# Patient Record
Sex: FEMALE | Race: WHITE | Employment: OTHER | URBAN - METROPOLITAN AREA
[De-identification: names, ages, dates, MRNs, and addresses within clinical notes are randomized per-mention and may not be internally consistent; named-entity substitution may affect disease eponyms.]

---

## 2018-02-13 ENCOUNTER — HOSPITAL ENCOUNTER (OUTPATIENT)
Dept: WOMENS IMAGING | Age: 83
Discharge: HOME | End: 2018-02-15
Payer: MEDICARE

## 2018-02-13 DIAGNOSIS — Z12.31 ENCOUNTER FOR SCREENING MAMMOGRAM FOR BREAST CANCER: ICD-10-CM

## 2018-02-13 PROCEDURE — 77063 BREAST TOMOSYNTHESIS BI: CPT

## 2019-05-08 ENCOUNTER — HOSPITAL ENCOUNTER (OUTPATIENT)
Dept: WOMENS IMAGING | Age: 84
Discharge: HOME | End: 2019-05-10
Payer: MEDICARE

## 2019-05-08 DIAGNOSIS — Z12.31 ENCOUNTER FOR SCREENING MAMMOGRAM FOR BREAST CANCER: ICD-10-CM

## 2019-05-08 PROCEDURE — 77063 BREAST TOMOSYNTHESIS BI: CPT

## 2020-11-05 ENCOUNTER — HOSPITAL ENCOUNTER (OUTPATIENT)
Dept: WOMENS IMAGING | Age: 85
Discharge: HOME | End: 2020-11-07
Payer: MEDICARE

## 2020-11-05 PROCEDURE — 77063 BREAST TOMOSYNTHESIS BI: CPT

## 2021-12-17 ENCOUNTER — HOSPITAL ENCOUNTER (OUTPATIENT)
Dept: WOMENS IMAGING | Age: 86
Discharge: HOME | End: 2021-12-19
Payer: MEDICARE

## 2021-12-17 DIAGNOSIS — Z12.31 ENCOUNTER FOR SCREENING MAMMOGRAM FOR MALIGNANT NEOPLASM OF BREAST: ICD-10-CM

## 2021-12-17 PROCEDURE — 77063 BREAST TOMOSYNTHESIS BI: CPT

## 2022-12-28 ENCOUNTER — HOSPITAL ENCOUNTER (OUTPATIENT)
Dept: WOMENS IMAGING | Age: 87
Discharge: HOME | End: 2022-12-30
Payer: MEDICARE

## 2022-12-28 DIAGNOSIS — Z12.31 ENCOUNTER FOR SCREENING MAMMOGRAM FOR MALIGNANT NEOPLASM OF BREAST: ICD-10-CM

## 2022-12-28 PROCEDURE — 77067 SCR MAMMO BI INCL CAD: CPT

## 2023-12-09 PROCEDURE — 87086 URINE CULTURE/COLONY COUNT: CPT

## 2023-12-09 PROCEDURE — 87186 SC STD MICRODIL/AGAR DIL: CPT

## 2023-12-10 ENCOUNTER — LAB REQUISITION (OUTPATIENT)
Dept: LAB | Facility: HOSPITAL | Age: 88
End: 2023-12-10
Payer: MEDICARE

## 2023-12-10 DIAGNOSIS — N39.0 URINARY TRACT INFECTION, SITE NOT SPECIFIED: ICD-10-CM

## 2023-12-10 DIAGNOSIS — R53.83 OTHER FATIGUE: ICD-10-CM

## 2023-12-12 LAB — BACTERIA UR CULT: ABNORMAL

## 2025-03-05 PROBLEM — N64.4 BREAST PAIN: Status: ACTIVE | Noted: 2025-03-05

## 2025-03-05 NOTE — PROGRESS NOTES
[facility]  Petra Valera female   6/3/1934 89 y.o.   [id number]      Chief Complaint  New patient, right axillary pain.    History Of Present Illness  Petra Valera is a very pleasant 89 y.o.  female with history of right breast cancer 25 years ago s/p partial mastectomy/ALND, radiation, and chemotherapy. She first noticed the pain after living heavy tree branches. She describes the pain under her right axilla and states it is not resolved with tylenol and OTC topicals. She has a history of back pain. SShe has no family history of breast cancer.     REPRODUCTIVE HISTORY: menarche age 12, , first birth age 26, did not breastfeed, no OCP's, natural menopause age 42, no HRT, scattered fibroglandular tissue    FAMILY CANCER HISTORY:   Mother: Colon cancer, age 57     Review of Systems  Constitutional:  Negative for appetite change, fatigue, fever and unexpected weight change.   HENT:  Negative for ear pain, hearing loss, nosebleeds, sore throat and trouble swallowing.    Eyes:  Negative for discharge, itching and visual disturbance.   Breast: As stated in HPI.  Respiratory:  Negative for cough, chest tightness and shortness of breath.    Cardiovascular:  Negative for chest pain, palpitations and leg swelling.   Gastrointestinal:  Negative for abdominal pain, constipation, diarrhea and nausea.   Endocrine: Negative for cold intolerance and heat intolerance.   Genitourinary:  Negative for dysuria, frequency, hematuria, pelvic pain and vaginal bleeding.   Musculoskeletal:  Negative for arthralgias, back pain, gait problem, joint swelling and myalgias.   Skin:  Negative for color change and rash.   Allergic/Immunologic: Negative for environmental allergies and food allergies.   Neurological:  Negative for dizziness, tremors, speech difficulty, weakness, numbness and headaches.   Hematological:  Does not bruise/bleed easily.   Psychiatric/Behavioral:  Negative for agitation, dysphoric  mood and sleep disturbance. The patient is not nervous/anxious.       Past Medical History  She has a past medical history of Breast cancer (Multi), antineoplastic chemo, and Personal history of irradiation.    Surgical History  She has a past surgical history that includes Breast biopsy and Breast lumpectomy.    Family History  Cancer-related family history includes Colon cancer (age of onset: 57) in her mother.     Social History  She has no history on file for tobacco use, alcohol use, and drug use.    Allergies  Penicillins    Medications  Current Outpatient Medications   Medication Instructions    amLODIPine (NORVASC) 5 mg, Daily RT    aspirin 81 mg capsule oral    blood sugar diagnostic strip Use with blood glucose test once daily    calcium phosphate-vitamin D3 125 mg-3.125 mcg (125 unit) tablet,chewable oral, Daily RT    levothyroxine (SYNTHROID, LEVOXYL) 125 mcg, Daily RT    losartan-hydrochlorothiazide (Hyzaar) 100-12.5 mg tablet 1 tablet, Daily RT    pravastatin (Pravachol) 40 mg tablet oral       Last Recorded Vitals  Vitals:    03/06/25 1332   BP: 146/70   Pulse: 105   Resp: 16       Physical Exam  Patient is alert and oriented x3 and in a relaxed and appropriate mood. Her gait is steady and hand grasps are equal. Sclera is clear. The breasts are nearly symmetrical. Right breast has a well healed partial mastectomy incision and axillary incision. The tissue is soft without palpable abnormalities, discrete nodules or masses. The skin and nipples appear normal. There is no cervical, supraclavicular or axillary lymphadenopathy.       Relevant Results and Imaging  Study Result    Narrative & Impression   Interpreted By:  Bee June  and Mindi Zuniga   STUDY:  BI US BREAST LIMITED RIGHT;  3/6/2025 2:26 pm      ACCESSION NUMBER(S):  [id number]      ORDERING CLINICIAN:  TE MCKAY      INDICATION:  Per patient, persistent pain in the upper-outer quadrant of the right  breast and right  axilla for about 4 months after doing yard work.  History of right breast cancer status post lumpectomy with  chemoradiation 25 years ago. Right axillary dissection. The patient  declines mammograms.      ,N64.4 Mastodynia      COMPARISON:  Correlation with mammograms dated 12/28/2022, 12/17/2021      FINDINGS:  Targeted ultrasound was performed of the area of pain in the right  axilla and upper outer quadrant of the right breast by a registered  sonographer with elastography.      Targeted ultrasound of the upper-outer quadrant demonstrates no  sonographic abnormality. No suspicious mass or areas of suspicious  acoustic shadowing are identified. The evaluated tissues are soft on  elastography.      Targeted ultrasound of the right axilla in the area of pain  demonstrates postsurgical changes including scarring and multiple  clips. No definite sonographic abnormality is identified. Tissues are  soft to intermediate stiffness on elastography.      A single morphologically normal lymph node is identified in the right  axilla.      IMPRESSION:  1. No targeted sonographic evidence of malignancy.  2. Benign postsurgical changes in the right axilla. No sonographic  abnormality is identified within the right axilla or breast to  explain the patient's pain. Recommendation is for clinical follow-up.      BI-RADS CATEGORY:  BI-RADS Category:  2 Benign.  Recommendation:  Clinical Follow-up and Continued Annual Screening.  Recommended Date:  Immediate.  Laterality:  Right.      For any future breast imaging appointments, please call [phone]).      I personally reviewed the images/study and I agree with the findings  as stated by Efrain Obregon MD (resident) . This study was  interpreted at [facility],  [place], [place].      MACRO:  None      Signed by: Bee June 3/6/2025 3:08 PM  Dictation workstation:   [id number]       I explained the results in depth, along  with suggested explanation for follow up recommendations based on the testing results. BI-RADS Category 2      Visit Diagnosis  1. Breast pain            Assessment  Normal clinical exam and imaging, right axillary pain, hx right breast cancer    Plan  Return to PCP for annual exams. Can try OTC topicals and OTC meds for pain like tylenol or motrin.    Patient Discussion/Summary  Your clinical examination and imaging are normal. You no longer need to be seen by a breast specialist for an annual physical breast examination. It is important to continue annual screenings and breast exams through your primary care provider. Please return to see me if you have a new breast problem or abnormal mammogram. It has been a pleasure having you as a patient.       You can see your health information, review clinical summaries from office visits & test results online when you follow your health with MY  Chart, a personal health record. To sign up go to [email or web address]. If you need assistance with signing up or trouble getting into your account call [employer] Patient Line 24/7 at [phone].    My office phone number is [phone] if you need to get in touch with me or have additional questions or concerns. Thank you for choosing [facility] and trusting me as your healthcare provider. I look forward to seeing you again at your next office visit. I am honored to be a provider on your health care team and I remain dedicated to helping you achieve your health goals.    Terri Bernard, IZABEL-CNP

## 2025-03-06 ENCOUNTER — HOSPITAL ENCOUNTER (OUTPATIENT)
Dept: RADIOLOGY | Facility: HOSPITAL | Age: OVER 89
Discharge: HOME | End: 2025-03-06
Payer: MEDICARE

## 2025-03-06 ENCOUNTER — OFFICE VISIT (OUTPATIENT)
Dept: SURGICAL ONCOLOGY | Facility: HOSPITAL | Age: OVER 89
End: 2025-03-06
Payer: MEDICARE

## 2025-03-06 VITALS
HEART RATE: 105 BPM | RESPIRATION RATE: 16 BRPM | WEIGHT: 144 LBS | SYSTOLIC BLOOD PRESSURE: 146 MMHG | HEIGHT: 62 IN | BODY MASS INDEX: 26.5 KG/M2 | DIASTOLIC BLOOD PRESSURE: 70 MMHG

## 2025-03-06 VITALS — WEIGHT: 143 LBS | BODY MASS INDEX: 26.31 KG/M2 | HEIGHT: 62 IN

## 2025-03-06 DIAGNOSIS — N64.4 BREAST PAIN: Primary | ICD-10-CM

## 2025-03-06 DIAGNOSIS — N64.4 BREAST PAIN: ICD-10-CM

## 2025-03-06 PROCEDURE — 99203 OFFICE O/P NEW LOW 30 MIN: CPT | Performed by: NURSE PRACTITIONER

## 2025-03-06 PROCEDURE — 76642 ULTRASOUND BREAST LIMITED: CPT | Mod: RT

## 2025-03-06 PROCEDURE — 1159F MED LIST DOCD IN RCRD: CPT | Performed by: NURSE PRACTITIONER

## 2025-03-06 PROCEDURE — 76981 USE PARENCHYMA: CPT | Mod: RT

## 2025-03-06 PROCEDURE — 99213 OFFICE O/P EST LOW 20 MIN: CPT | Performed by: NURSE PRACTITIONER

## 2025-03-06 RX ORDER — PRAVASTATIN SODIUM 40 MG/1
TABLET ORAL
COMMUNITY

## 2025-03-06 RX ORDER — LOSARTAN POTASSIUM AND HYDROCHLOROTHIAZIDE 12.5; 1 MG/1; MG/1
1 TABLET ORAL
COMMUNITY
Start: 2024-09-04

## 2025-03-06 RX ORDER — AMLODIPINE BESYLATE 5 MG/1
5 TABLET ORAL
COMMUNITY
Start: 2024-09-04

## 2025-03-06 RX ORDER — LEVOTHYROXINE SODIUM 125 UG/1
125 TABLET ORAL
COMMUNITY
Start: 2024-11-05